# Patient Record
Sex: FEMALE | Race: WHITE | NOT HISPANIC OR LATINO | Employment: OTHER | URBAN - METROPOLITAN AREA
[De-identification: names, ages, dates, MRNs, and addresses within clinical notes are randomized per-mention and may not be internally consistent; named-entity substitution may affect disease eponyms.]

---

## 2021-12-16 ENCOUNTER — OFFICE VISIT (OUTPATIENT)
Dept: VASCULAR SURGERY | Facility: HOSPITAL | Age: 86
End: 2021-12-16

## 2021-12-16 VITALS
HEIGHT: 64 IN | TEMPERATURE: 97.6 F | RESPIRATION RATE: 16 BRPM | OXYGEN SATURATION: 96 % | BODY MASS INDEX: 18.78 KG/M2 | HEART RATE: 114 BPM | DIASTOLIC BLOOD PRESSURE: 58 MMHG | SYSTOLIC BLOOD PRESSURE: 80 MMHG | WEIGHT: 110 LBS

## 2021-12-16 DIAGNOSIS — I70.234 ATHEROSCLEROSIS OF NATIVE ARTERY OF BOTH LOWER EXTREMITIES WITH BILATERAL ULCERATION OF HEELS (HCC): Primary | ICD-10-CM

## 2021-12-16 DIAGNOSIS — I70.244 ATHEROSCLEROSIS OF NATIVE ARTERY OF BOTH LOWER EXTREMITIES WITH BILATERAL ULCERATION OF HEELS (HCC): Primary | ICD-10-CM

## 2021-12-16 PROCEDURE — G0463 HOSPITAL OUTPT CLINIC VISIT: HCPCS | Performed by: SURGERY

## 2021-12-16 PROCEDURE — 99203 OFFICE O/P NEW LOW 30 MIN: CPT | Performed by: SURGERY

## 2021-12-16 RX ORDER — LANCETS 33 GAUGE
1 EACH MISCELLANEOUS 2 TIMES DAILY
COMMUNITY
Start: 2021-09-27

## 2021-12-16 RX ORDER — UREA 10 %
2 LOTION (ML) TOPICAL DAILY
COMMUNITY
Start: 2021-09-21

## 2021-12-16 RX ORDER — CEFDINIR 300 MG/1
300 CAPSULE ORAL 2 TIMES DAILY
COMMUNITY

## 2021-12-16 RX ORDER — ASCORBIC ACID 500 MG
500 TABLET ORAL DAILY
COMMUNITY
Start: 2021-09-21

## 2021-12-16 RX ORDER — MONTELUKAST SODIUM 10 MG/1
10 TABLET ORAL DAILY
COMMUNITY
Start: 2021-11-27

## 2021-12-16 RX ORDER — IVERMECTIN 3 MG/1
TABLET ORAL
COMMUNITY
Start: 2021-09-21

## 2021-12-16 RX ORDER — LISINOPRIL 5 MG/1
5 TABLET ORAL DAILY
COMMUNITY
Start: 2021-11-27

## 2021-12-16 RX ORDER — PIOGLITAZONEHYDROCHLORIDE 30 MG/1
30 TABLET ORAL DAILY
COMMUNITY
Start: 2021-11-27

## 2021-12-16 RX ORDER — POVIDONE-IODINE 10 MG/ML
SOLUTION TOPICAL
COMMUNITY
Start: 2021-11-24

## 2021-12-16 RX ORDER — ATORVASTATIN CALCIUM 40 MG/1
40 TABLET, FILM COATED ORAL DAILY
COMMUNITY
Start: 2021-11-27

## 2021-12-16 RX ORDER — DONEPEZIL HYDROCHLORIDE 5 MG/1
5 TABLET, FILM COATED ORAL
COMMUNITY
Start: 2021-12-08

## 2021-12-16 RX ORDER — MEMANTINE HYDROCHLORIDE 5 MG/1
5 TABLET ORAL DAILY
COMMUNITY
Start: 2021-12-08

## 2021-12-16 RX ORDER — MIRTAZAPINE 30 MG/1
30 TABLET, FILM COATED ORAL DAILY
COMMUNITY
Start: 2021-11-27

## 2021-12-16 RX ORDER — PANTOPRAZOLE SODIUM 40 MG/1
40 TABLET, DELAYED RELEASE ORAL DAILY
COMMUNITY
Start: 2021-11-27

## 2021-12-16 RX ORDER — LEVOTHYROXINE SODIUM 0.05 MG/1
TABLET ORAL
COMMUNITY
Start: 2021-12-08

## 2021-12-16 RX ORDER — CASTOR OIL AND BALSAM, PERU 788; 87 MG/G; MG/G
OINTMENT TOPICAL
COMMUNITY
Start: 2021-12-07

## 2021-12-16 RX ORDER — INSULIN GLARGINE 100 [IU]/ML
INJECTION, SOLUTION SUBCUTANEOUS
COMMUNITY
Start: 2021-10-25

## 2021-12-16 RX ORDER — BLOOD SUGAR DIAGNOSTIC
1 STRIP MISCELLANEOUS 2 TIMES DAILY
COMMUNITY
Start: 2021-11-03

## 2021-12-16 RX ORDER — ONDANSETRON HYDROCHLORIDE 8 MG/1
8 TABLET, FILM COATED ORAL EVERY 8 HOURS PRN
COMMUNITY

## 2021-12-16 RX ORDER — MAGNESIUM OXIDE 400 MG/1
1 TABLET ORAL 3 TIMES DAILY PRN
COMMUNITY
Start: 2021-11-27

## 2021-12-16 NOTE — PROGRESS NOTES
[facility]   HISTORY AND PHYSICAL    Patient Name: Maite Acosta  : 1935  MRN: [id number]  Primary Care Physician:  Javy Mckeon DO      Subjective   Subjective     Chief Complaint: Bilateral foot ulcers    HPI:    Maite Acosta is a 86 y.o. female who presents for evaluation of bilateral foot ulcers.  Patient presents with her daughter.  Daughter states that she is demented.  She was hospitalized in September for Covid.  She has not recovered much since then.  She also had a hip fracture that was repaired.  She is currently bedridden and nonambulatory.  She requires a lift from the bed to the wheelchair and she wears a diaper.  She also has a decubitus ulcer.  She has ulcers on the bilateral heels as well as on the lateral aspect of the feet.  Patient herself is pretty much nonverbal.  When asking her questions she will looks confused and does not answer much.        Personal History     Past Medical History:   Diagnosis Date   • Dementia (HCC)    • Disease of thyroid gland    • Hyperlipidemia    • Hypertension        History reviewed. No pertinent surgical history.    Family History: family history is not on file. Otherwise pertinent FHx was reviewed and not pertinent to current issue.    Social History:  reports that she has quit smoking. Her smoking use included cigarettes. She has never used smokeless tobacco. She reports that she does not drink alcohol and does not use drugs.    Home Medications:  Current Outpatient Medications on File Prior to Visit   Medication Sig   • ascorbic acid (VITAMIN C) 500 MG tablet Take 500 mg by mouth Daily.   • atorvastatin (LIPITOR) 40 MG tablet Take 40 mg by mouth Daily.   • castor oil-balsam peru (VENELEX) ointment apply TO LEFT heel TWICE DAILY   • cefdinir (OMNICEF) 300 MG capsule Take 300 mg by mouth 2 (Two) Times a Day.   • donepezil (ARICEPT) 5 MG tablet Take 5 mg by mouth every night at bedtime.   • ivermectin (STROMECTOL) 3 MG tablet tablet  TAKE EIGHT TABLETS BY MOUTH DAILY FOR 5 DAYS   • Lancets (OneTouch Delica Plus Fufetr46P) misc 1 each by Other route 2 (Two) Times a Day.   • Lantus SoloStar 100 UNIT/ML injection pen inject 10-15 units UNDER THE SKIN daily   • levothyroxine (SYNTHROID, LEVOTHROID) 50 MCG tablet TAKE ONE TABLET BY MOUTH EVERY OTHER MORNING ON AN EMPTY STOMACH   • lisinopril (PRINIVIL,ZESTRIL) 5 MG tablet Take 5 mg by mouth Daily.   • magnesium oxide (MAG-OX) 400 MG tablet Take 1 tablet by mouth 3 (Three) Times a Day As Needed.   • memantine (NAMENDA) 5 MG tablet Take 5 mg by mouth Daily.   • mirtazapine (REMERON) 30 MG tablet Take 30 mg by mouth Daily.   • montelukast (SINGULAIR) 10 MG tablet Take 10 mg by mouth Daily.   • ondansetron (ZOFRAN) 8 MG tablet Take 8 mg by mouth Every 8 (Eight) Hours As Needed for Nausea or Vomiting.   • OneTouch Verio test strip 1 each by Other route 2 (Two) Times a Day.   • pantoprazole (PROTONIX) 40 MG EC tablet Take 40 mg by mouth Daily.   • pioglitazone (ACTOS) 30 MG tablet Take 30 mg by mouth Daily.   • povidone-iodine 10 % swab SWAB TOPICALLY TO RIGHT HEEL DAILY   • vitamin D3 125 MCG (5000 UT) capsule capsule Take  by mouth Daily.   • Zinc Sulfate 220 (50 Zn) MG tablet Take 2 tablets by mouth Daily.     No current facility-administered medications on file prior to visit.          Allergies:  No Known Allergies    Objective   Objective     Vitals:   Temp:  [97.6 °F (36.4 °C)] 97.6 °F (36.4 °C)  Heart Rate:  [114] 114  Resp:  [16] 16  BP: (80)/(58) 80/58    Physical Exam  HENT:      Head: Normocephalic and atraumatic.   Eyes:      Extraocular Movements: Extraocular movements intact.      Pupils: Pupils are equal, round, and reactive to light.   Cardiovascular:      Rate and Rhythm: Regular rhythm. Tachycardia present.      Pulses:           Femoral pulses are 2+ on the right side and 2+ on the left side.       Popliteal pulses are 0 on the right side and 0 on the left side.        Dorsalis pedis  pulses are 0 on the right side and 0 on the left side.        Posterior tibial pulses are 0 on the right side and 0 on the left side.      Comments: Bilateral large heel ulcers.  Drainage from the right ulcer.  Minimal erythema.  Pulmonary:      Effort: Pulmonary effort is normal.      Breath sounds: Normal breath sounds.   Abdominal:      General: Abdomen is flat. Bowel sounds are normal.      Palpations: Abdomen is soft.   Musculoskeletal:      Cervical back: Neck supple.   Skin:     General: Skin is warm and dry.   Neurological:      General: No focal deficit present.      Mental Status: She is alert and oriented to person, place, and time.            Result Review    Most notable findings include:     Assessment/Plan   Assessment / Plan     Brief Patient Summary:  Maite Acosta is a 86 y.o. female who has pressure ulcers with PVD of the bilateral lower extremities    Diagnoses and all orders for this visit:    1. 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  Patient is demented, bedridden, and nonambulatory.  There is no role for limb salvage.  She also has a large decubitus ulcer.  I explained to the patient that we do not intervene on patients who do not ambulate.  Options are for local wound care versus amputation.  However, amputation would further decrease her quality of life.  Patient seems to be deteriorating rapidly and is not a candidate for any sort of intervention.    Thank you for allowing me to see your patient.        Electronically signed by Cecilio Diaz MD, MD, 12/16/21, 10:00 AM EST.